# Patient Record
Sex: FEMALE | URBAN - METROPOLITAN AREA
[De-identification: names, ages, dates, MRNs, and addresses within clinical notes are randomized per-mention and may not be internally consistent; named-entity substitution may affect disease eponyms.]

---

## 2017-12-13 ENCOUNTER — CHARTING TRANS (OUTPATIENT)
Dept: OBGYN | Age: 47
End: 2017-12-13

## 2017-12-13 ENCOUNTER — LAB SERVICES (OUTPATIENT)
Dept: OTHER | Age: 47
End: 2017-12-13

## 2017-12-13 LAB — TSH SERPL DL<=0.05 MIU/L-ACNC: 1.15 M[IU]/L (ref 0.3–4.82)

## 2017-12-14 ENCOUNTER — CHARTING TRANS (OUTPATIENT)
Dept: OTHER | Age: 47
End: 2017-12-14

## 2017-12-15 ENCOUNTER — LAB SERVICES (OUTPATIENT)
Dept: OTHER | Age: 47
End: 2017-12-15

## 2017-12-15 ENCOUNTER — IMAGING SERVICES (OUTPATIENT)
Dept: OTHER | Age: 47
End: 2017-12-15

## 2017-12-15 ENCOUNTER — CHARTING TRANS (OUTPATIENT)
Dept: OTHER | Age: 47
End: 2017-12-15

## 2017-12-15 LAB — PREGNANCY TEST, URINE: NEGATIVE

## 2017-12-18 ENCOUNTER — CHARTING TRANS (OUTPATIENT)
Dept: OTHER | Age: 47
End: 2017-12-18

## 2017-12-19 LAB — AP REPORT: NORMAL

## 2017-12-20 ENCOUNTER — CHARTING TRANS (OUTPATIENT)
Dept: OTHER | Age: 47
End: 2017-12-20

## 2017-12-21 ENCOUNTER — CHARTING TRANS (OUTPATIENT)
Dept: OTHER | Age: 47
End: 2017-12-21

## 2018-11-27 VITALS
DIASTOLIC BLOOD PRESSURE: 78 MMHG | HEIGHT: 66 IN | SYSTOLIC BLOOD PRESSURE: 118 MMHG | WEIGHT: 187 LBS | BODY MASS INDEX: 30.05 KG/M2

## 2024-09-23 ENCOUNTER — APPOINTMENT (OUTPATIENT)
Dept: MRI IMAGING | Facility: HOSPITAL | Age: 54
End: 2024-09-23
Attending: EMERGENCY MEDICINE
Payer: MEDICAID

## 2024-09-23 ENCOUNTER — HOSPITAL ENCOUNTER (EMERGENCY)
Facility: HOSPITAL | Age: 54
Discharge: HOME | End: 2024-09-23
Attending: EMERGENCY MEDICINE
Payer: MEDICAID

## 2024-09-23 VITALS
WEIGHT: 160 LBS | OXYGEN SATURATION: 100 % | HEART RATE: 59 BPM | SYSTOLIC BLOOD PRESSURE: 133 MMHG | RESPIRATION RATE: 16 BRPM | DIASTOLIC BLOOD PRESSURE: 83 MMHG | TEMPERATURE: 97 F | HEIGHT: 66 IN | BODY MASS INDEX: 25.71 KG/M2

## 2024-09-23 DIAGNOSIS — R20.0 LEFT SIDED NUMBNESS: ICD-10-CM

## 2024-09-23 DIAGNOSIS — R51.9 NONINTRACTABLE HEADACHE, UNSPECIFIED CHRONICITY PATTERN, UNSPECIFIED HEADACHE TYPE: Primary | ICD-10-CM

## 2024-09-23 LAB
ALBUMIN SERPL-MCNC: 4.2 G/DL (ref 3.2–4.8)
ALBUMIN/GLOB SERPL: 1.4 {RATIO} (ref 1–2)
ALP LIVER SERPL-CCNC: 72 U/L
ALT SERPL-CCNC: 14 U/L
ANION GAP SERPL CALC-SCNC: 4 MMOL/L (ref 0–18)
AST SERPL-CCNC: 14 U/L (ref ?–34)
ATRIAL RATE: 59 BPM
BASOPHILS # BLD AUTO: 0.06 X10(3) UL (ref 0–0.2)
BASOPHILS NFR BLD AUTO: 0.7 %
BILIRUB SERPL-MCNC: 0.4 MG/DL (ref 0.3–1.2)
BUN BLD-MCNC: 17 MG/DL (ref 9–23)
CALCIUM BLD-MCNC: 9.3 MG/DL (ref 8.7–10.4)
CHLORIDE SERPL-SCNC: 108 MMOL/L (ref 98–112)
CO2 SERPL-SCNC: 27 MMOL/L (ref 21–32)
CREAT BLD-MCNC: 0.69 MG/DL
EGFRCR SERPLBLD CKD-EPI 2021: 104 ML/MIN/1.73M2 (ref 60–?)
EOSINOPHIL # BLD AUTO: 0.17 X10(3) UL (ref 0–0.7)
EOSINOPHIL NFR BLD AUTO: 2 %
ERYTHROCYTE [DISTWIDTH] IN BLOOD BY AUTOMATED COUNT: 13.2 %
GLOBULIN PLAS-MCNC: 3.1 G/DL (ref 2–3.5)
GLUCOSE BLD-MCNC: 96 MG/DL (ref 70–99)
HCT VFR BLD AUTO: 44.1 %
HGB BLD-MCNC: 15.1 G/DL
IMM GRANULOCYTES # BLD AUTO: 0.02 X10(3) UL (ref 0–1)
IMM GRANULOCYTES NFR BLD: 0.2 %
LYMPHOCYTES # BLD AUTO: 2.53 X10(3) UL (ref 1–4)
LYMPHOCYTES NFR BLD AUTO: 30.1 %
MCH RBC QN AUTO: 29.2 PG (ref 26–34)
MCHC RBC AUTO-ENTMCNC: 34.2 G/DL (ref 31–37)
MCV RBC AUTO: 85.3 FL
MONOCYTES # BLD AUTO: 0.83 X10(3) UL (ref 0.1–1)
MONOCYTES NFR BLD AUTO: 9.9 %
NEUTROPHILS # BLD AUTO: 4.8 X10 (3) UL (ref 1.5–7.7)
NEUTROPHILS # BLD AUTO: 4.8 X10(3) UL (ref 1.5–7.7)
NEUTROPHILS NFR BLD AUTO: 57.1 %
OSMOLALITY SERPL CALC.SUM OF ELEC: 289 MOSM/KG (ref 275–295)
P AXIS: 30 DEGREES
P-R INTERVAL: 152 MS
PLATELET # BLD AUTO: 251 10(3)UL (ref 150–450)
POTASSIUM SERPL-SCNC: 4 MMOL/L (ref 3.5–5.1)
PROT SERPL-MCNC: 7.3 G/DL (ref 5.7–8.2)
Q-T INTERVAL: 428 MS
QRS DURATION: 68 MS
QTC CALCULATION (BEZET): 423 MS
R AXIS: 8 DEGREES
RBC # BLD AUTO: 5.17 X10(6)UL
SODIUM SERPL-SCNC: 139 MMOL/L (ref 136–145)
T AXIS: 65 DEGREES
TROPONIN I SERPL HS-MCNC: 3 NG/L
VENTRICULAR RATE: 59 BPM
WBC # BLD AUTO: 8.4 X10(3) UL (ref 4–11)

## 2024-09-23 PROCEDURE — 84484 ASSAY OF TROPONIN QUANT: CPT | Performed by: EMERGENCY MEDICINE

## 2024-09-23 PROCEDURE — 70551 MRI BRAIN STEM W/O DYE: CPT | Performed by: EMERGENCY MEDICINE

## 2024-09-23 PROCEDURE — 85025 COMPLETE CBC W/AUTO DIFF WBC: CPT

## 2024-09-23 PROCEDURE — 93005 ELECTROCARDIOGRAM TRACING: CPT

## 2024-09-23 PROCEDURE — 80053 COMPREHEN METABOLIC PANEL: CPT | Performed by: EMERGENCY MEDICINE

## 2024-09-23 PROCEDURE — 93010 ELECTROCARDIOGRAM REPORT: CPT

## 2024-09-23 PROCEDURE — 85025 COMPLETE CBC W/AUTO DIFF WBC: CPT | Performed by: EMERGENCY MEDICINE

## 2024-09-23 PROCEDURE — 80053 COMPREHEN METABOLIC PANEL: CPT

## 2024-09-23 PROCEDURE — 99285 EMERGENCY DEPT VISIT HI MDM: CPT

## 2024-09-23 PROCEDURE — 36415 COLL VENOUS BLD VENIPUNCTURE: CPT

## 2024-09-23 NOTE — ED PROVIDER NOTES
Patient Seen in: [facility] Emergency Department      History     Chief Complaint   Patient presents with    Numbness Weakness            Stated Complaint: dizzy, off balance, and numbness to right face and right arm. Pt states symptom*    Subjective:   HPI    This is a 54-year-old right-handed female no significant past medical history of presents with multiple ongoing symptoms over the last 2 to 3 months.  Patient reports she gets a pressure sensation on the top of her head which radiates to her upper spine.  She feels like her face is being squeezed.  She has left arm and left leg weakness which has been ongoing for 3 months.  She feels like everything is getting progressively worse.  She has decreased sensation also in her left arm and left leg.  She feels like her vision gets blurry at times.  She states her family comments that her speech is \"off \".  She states she saw her primary care physician who did \"some blood work \"and that was it.  She denies any chest pain or shortness of breath.  No fevers chills cough or cold symptoms.  No nausea, vomiting or diarrhea.  She also admits about a 10 pound weight loss in the last few months.  She does not smoke tobacco.  She does not vape.  She drinks alcohol socially.  No illicit drug use.  She states she had a neighbor drive her here today because she feels like symptoms are getting worse.    Objective:   No pertinent past medical history.            No pertinent past surgical history.              No pertinent social history.            Review of Systems    Positive for stated Chief Complaint: Numbness Weakness (/)    Other systems are as noted in HPI.  Constitutional and vital signs reviewed.      All other systems reviewed and negative except as noted above.    Physical Exam     ED Triage Vitals [09/23/24 1551]   /80   Pulse 67   Resp 18   Temp 96.9 °F (36.1 °C)   Temp src Temporal   SpO2 96 %   O2 Device None (Room air)       Current Vitals:   Vital  Signs  BP: 133/83  Pulse: 59  Resp: 16  Temp: 96.9 °F (36.1 °C)  Temp src: Temporal  MAP (mmHg): 92    Oxygen Therapy  SpO2: 100 %  O2 Device: None (Room air)            Physical Exam    GENERAL: Awake, alert oriented x3, nontoxic appearing.   SKIN: Normal, warm, and dry.  HEENT:  Pupils equally round and reactive to light. Conjuctiva clear.  Oropharynx is clear and moist.   Lungs: Clear to auscultation bilaterally with no rales, no retractions, and no wheezing.  HEART:  Regular rate and rhythm. S1 and S2. No murmurs, no rubs or gallops.   ABDOMEN: Soft, nontender and nondistended. Normoactive bowel sounds. No rebound. No guarding.   EXTREMITIES: Warm with brisk capillary refill.   Neuro: Speech does appear to be slurred.  No facial asymmetry.  Decree sensation to left side of face/arm/leg.   strength is equal bilaterally.  She can straight leg against gravity bilaterally.  Good strength with dorsi/plantarflexion.    ED Course     Labs Reviewed   COMP METABOLIC PANEL (14) - Normal   TROPONIN I HIGH SENSITIVITY - Normal   CBC WITH DIFFERENTIAL WITH PLATELET   RAINBOW DRAW LAVENDER   RAINBOW DRAW LIGHT GREEN   RAINBOW DRAW BLUE   RAINBOW DRAW GOLD     EKG    Rate, intervals and axes as noted on EKG Report.  Rate: 59  Rhythm: Bradycardia  Reading: No acute changes               MRI BRAIN (CPT=70551)    Result Date: 9/23/2024  PROCEDURE:  MRI BRAIN (CPT=70551)  COMPARISON:  None.  INDICATIONS:  dizzy, off balance, and numbness to right face and right arm. Pt states symptoms have been going on for 3-4 days. patient also states she feels she has BLE nikki  TECHNIQUE:  MRI of the brain was performed with multi-planar T1, T2-weighted images with FLAIR sequences and diffusion weighted images without infusion.  PATIENT STATED HISTORY: (As transcribed by Technologist)  Patient complains of dizziness and numbness to right face and right arm x3 days.    FINDINGS:  INTRACRANIAL:  No intracranial hemorrhage, mass effect, or  evidence of acute infarct.  Patchy T2/FLAIR signal hyperintensity of the periventricular and subcortical white matter most in keeping with chronic microvascular ischemia.  VENTRICLES/SULCI:   Ventricles and sulci concordant with age.  No hydrocephalus.  Basilar cisterns are patent. SINUSES/ORBITS:       The visualized paranasal sinuses are clear.  The orbits are unremarkable. MASTOIDS:      The mastoids are clear.            CONCLUSION:  No acute intracranial process identified.   LOCATION:  [id number]   Dictated by (CST): Jose Garrido MD on 9/23/2024 at 7:57 PM     Finalized by (CST): Jose Garrido MD on 9/23/2024 at 8:00 PM                [facility]         This is a 54-year-old right-handed female no significant past medical history of presents with multiple ongoing symptoms over the last 2 to 3 months.  Patient reports she gets a pressure sensation on the top of her head which radiates to her upper spine.  She feels like her face is being squeezed.  She has left arm and left leg weakness which has been ongoing for 3 months.  She feels like everything is getting progressively worse.  She has decreased sensation also in her left arm and left leg.  She feels like her vision gets blurry at times.  She states her family comments that her speech is \"off \".  Who is MS, brain tumor, spinal tumor, stroke.      Patient placed on cardiac monitor, continuous pulse oximetry and IV line was established of normal saline.  Basic labs were obtained.  CBC: White blood cell count 8.4.  Hemoglobin 15.1.  Platelet 251.  CMP: BUN 17.  Creatinine 0.6.  Glucose 96.  Bicarb 27.      Discussed case with neurology Dr. Nicanor posada MRI brain.      MRI brain demonstrated no acute intracranial process.      Findings were communicated to patient.  We will have her follow-up with neurology as outpatient.  Return if worse.  No clear etiology for her symptoms.  Will warrant further outpatient evaluation.  Patient discharged home in good condition with  family.    Disposition and Plan     Clinical Impression:  1. Nonintractable headache, unspecified chronicity pattern, unspecified headache type    2. Left sided numbness         Disposition:  Discharge  9/23/2024  8:21 pm    Follow-up:  Blake Donsi DO  [address] [place] [id number]  [phone]    Follow up on 9/23/2024            Medications Prescribed:  There are no discharge medications for this patient.

## 2024-09-23 NOTE — ED INITIAL ASSESSMENT (HPI)
Pt ambulatory to ED c/o dizziness and \"off balance\" for 4-5 days, +headache, numbness on left side of face and arm, states she also feels generalized swelling, moving all extremities equally/speaking clearly in triage

## 2024-09-24 NOTE — DISCHARGE INSTRUCTIONS
Return if worse    Follow-up with neurology first available appointment.  When you call make sure you state you are in the emergency room and referred for an urgent appointment